# Patient Record
Sex: MALE | Race: WHITE | NOT HISPANIC OR LATINO | Employment: OTHER | URBAN - NONMETROPOLITAN AREA
[De-identification: names, ages, dates, MRNs, and addresses within clinical notes are randomized per-mention and may not be internally consistent; named-entity substitution may affect disease eponyms.]

---

## 2017-01-25 ENCOUNTER — OFFICE VISIT - GICH (OUTPATIENT)
Dept: UROLOGY | Facility: OTHER | Age: 67
End: 2017-01-25

## 2017-01-25 ENCOUNTER — HISTORY (OUTPATIENT)
Dept: UROLOGY | Facility: OTHER | Age: 67
End: 2017-01-25

## 2017-01-25 ENCOUNTER — HOSPITAL ENCOUNTER (OUTPATIENT)
Dept: RADIOLOGY | Facility: OTHER | Age: 67
End: 2017-01-25
Attending: UROLOGY

## 2017-01-25 DIAGNOSIS — N50.9 DISORDER OF MALE GENITAL ORGANS: ICD-10-CM

## 2018-01-03 NOTE — NURSING NOTE
Patient Information     Patient Name MRN Tomi Torres Jr. [id number] Male 1950      Nursing Note by Yokasta Valles at 2017  9:45 AM     Author:  Yokasta Valles Service:  (none) Author Type:  (none)     Filed:  2017 10:12 AM Encounter Date:  2017 Status:  Signed     :  Yokasta Valles            Here for area on testicle.  Review of Systems:    Weight loss:    No     Recent fever/chills:  No   Night sweats:   No  Current skin rash:  No   Recent hair loss:  No  Heat intolerance:  No   Cold intolerance:  No  Chest pain:   No   Palpitations:   No  Shortness of breath:  No   Wheezing:   No  Constipation:    No   Diarrhea:   No   Nausea:   No   Vomiting:   No   Kidney/side pain:  Yes   Back pain:   Yes  Frequent headaches:  No   Dizziness:     No  Leg swelling:   No   Calf pain:    Yes  Yokasta Valles LPN  2017  9:52 AM

## 2018-01-03 NOTE — PROGRESS NOTES
"Patient Information     Patient Name MRTomi Moreira Jr. [id number] Male 1950      Progress Notes by Michele Chino MD at 2017  9:45 AM     Author:  Michele Chino MD Service:  (none) Author Type:  Physician     Filed:  2017 10:57 AM Encounter Date:  2017 Status:  Signed     :  Michele Chino MD (Physician)            Type of Visit  Established    Chief Complaint  lesion in scrotum    HPI  Mr. Peralta is a 66 y.o. male follows up with recent findings on self-exam of a lesion in his scrotum.  He explains that about 2-3 months ago he noticed a lesion on the top of his left testicle.  He did some research online and has become concerned about possible testicular cancer.  He's never been seen for this before.  The lesions do not cause pain or other symptoms.  The lesions have not changed in size over this timeframe.      Review of Systems  I reviewed the ROS the patient today.    Nursing Notes:   Yokasta Valles  2017 10:12 AM  Signed  Here for area on testicle.  Review of Systems:    Weight loss:    No     Recent fever/chills:  No   Night sweats:   No  Current skin rash:  No   Recent hair loss:  No  Heat intolerance:  No   Cold intolerance:  No  Chest pain:   No   Palpitations:   No  Shortness of breath:  No   Wheezing:   No  Constipation:    No   Diarrhea:   No   Nausea:   No   Vomiting:   No   Kidney/side pain:  Yes   Back pain:   Yes  Frequent headaches:  No   Dizziness:     No  Leg swelling:   No   Calf pain:    Yes  Yokasta Valles LPN  2017  9:52 AM                Physical Exam  Vitals:     17 0949   BP: 118/60   Resp: 12   Weight: 84.5 kg (186 lb 3.2 oz)   Height: 1.85 m (6' 0.83\")     Constitutional: NAD, WDWN.  Cardiovascular: Regular rate.  Pulmonary/Chest: Respirations are even and non-labored bilaterally.  Abdominal: Soft. No distension, tenderness, masses or guarding. No CVA tenderness.  Extremities: BRYCE x 4, Warm. No clubbing.  No cyanosis.  "   Skin: Pink, warm and dry.  No rashes noted.  Genitourinary: nonpalpable bladder  palpable epididymal head cysts, left larger than right    Radiographic Studies  1/25/2017  Scrotal U/S  bilateral epididymal head cysts  report is pending    Assessment & Plan  Mr. Peralta is a 66 y.o. male follows up with asymptomatic bilateral epididymal head cysts confirmed on ultrasound.  Review the ultrasound today with the patient.  I explained that these are benign and do not require any further imaging or testing or treatment.  They are asymptomatic.  He will follow-up as needed

## 2018-01-03 NOTE — PROGRESS NOTES
Patient Information     Patient Name MRN Tomi Torres Jr. [id number] Male 1950      Progress Notes by Matilda Guallpa R.T. ([facility]) at 2017 11:03 AM     Author:  [facility] Service:  (none) Author Type:  [employer] - Registered Radiologic Technologist     Filed:  2017 11:03 AM Date of Service:  2017 11:03 AM Status:  Signed     :  Matilda Guallpa R.T. ([facility]) ([facility] - Registered Radiologic Technologist)            Falls Risk Criteria:    Age 65 and older or under age 4        Sensory deficits    Poor vision    Use of ambulatory aides    Impaired judgment    Unable to walk independently    Meets High Risk criteria for falls:  Yes             1.  Do you have dizziness or vertigo?    no                    2.  Do you need help standing or walking?   no                 3.  Have you fallen within the last 6 months?    no           4.  Has the patient been fasting?      no       If any risks are marked Yes, the following interventions are utilized:    Do not leave patient unattended     Assist patient in the dressing room and bathroom    Have ambulatory aides available throughout procedure    Involve patient s family if available

## 2018-01-26 VITALS
RESPIRATION RATE: 12 BRPM | DIASTOLIC BLOOD PRESSURE: 60 MMHG | HEIGHT: 73 IN | BODY MASS INDEX: 24.68 KG/M2 | SYSTOLIC BLOOD PRESSURE: 118 MMHG | WEIGHT: 186.2 LBS

## 2018-02-26 ENCOUNTER — DOCUMENTATION ONLY (OUTPATIENT)
Dept: FAMILY MEDICINE | Facility: OTHER | Age: 68
End: 2018-02-26

## 2018-02-26 PROBLEM — N50.9 SCROTAL LESION: Status: ACTIVE | Noted: 2017-01-25

## 2018-02-26 RX ORDER — MAGNESIUM HYDROXIDE 1200 MG/15ML
LIQUID ORAL
COMMUNITY
Start: 2015-02-09

## 2018-02-26 RX ORDER — ACETAMINOPHEN 500 MG
1000 TABLET ORAL EVERY 6 HOURS PRN
COMMUNITY

## 2022-11-15 ENCOUNTER — OFFICE VISIT (OUTPATIENT)
Dept: OTOLARYNGOLOGY | Facility: OTHER | Age: 72
End: 2022-11-15
Attending: OTOLARYNGOLOGY
Payer: MEDICARE

## 2022-11-15 DIAGNOSIS — H90.A22 SENSORINEURAL HEARING LOSS, UNILATERAL, LEFT EAR, WITH RESTRICTED HEARING ON THE CONTRALATERAL SIDE: Primary | ICD-10-CM

## 2022-11-15 PROCEDURE — G0463 HOSPITAL OUTPT CLINIC VISIT: HCPCS

## 2022-11-18 NOTE — PROGRESS NOTES
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  He had a recent hearing test by Ynes Salinas that revealed a sloping sensorineural hearing loss but a slight conductive component in the right ear.  Patient notices difficulty in background noise but otherwise feels he hears well.  He is not terribly interested in hearing aids.  Unfortunately we do not have a hearing test available for review that he had performed earlier this year.     Exam  The external auditory canals are clear.  There is some mild retraction of the tympanic membranes bilaterally.  There is no middle ear fluid.   Tuning fork responses are normal  Remainder of the head neck exam is unremarkable  Audiogram demonstrates a sloping sensorineural hearing loss in both ears above 2000 hertz.  He has a slight conductive hearing loss at 1000 hertz on the right.  He has a speech reception threshold of 30 decibels on the right and 20 decibels on the left.  His discrimination scores remain 100%.    A&P  Assessment & Plan  (1) Sensorineural hearing loss (SNHL) of left ear with restricted hearing of right ear:        Status: Acute        Code(s):  H90.A22 - Sensorineural hearing loss, unilateral, left ear, with restricted hearing on the contralateral side  I do not feel any surgical or medical intervention is necessary regarding his ears at this time.  I am neutral on hearing aids.  I told him that if he would like to  hear better it would not be unreasonable to give hearing aids trial.  If he is not interested in aiding he can wait for now.  I would recommend he follow up for an audiogram in a year or 2 to make sure his hearing remains stable.      Jed Sterling MD    11/15/22 0827    <Electronically signed by Jed Sterling MD> 11/16/22 1016